# Patient Record
Sex: MALE | Race: BLACK OR AFRICAN AMERICAN | URBAN - METROPOLITAN AREA
[De-identification: names, ages, dates, MRNs, and addresses within clinical notes are randomized per-mention and may not be internally consistent; named-entity substitution may affect disease eponyms.]

---

## 2017-02-13 ENCOUNTER — APPOINTMENT (OUTPATIENT)
Dept: GENERAL RADIOLOGY | Facility: CLINIC | Age: 52
End: 2017-02-13
Attending: EMERGENCY MEDICINE
Payer: COMMERCIAL

## 2017-02-13 ENCOUNTER — HOSPITAL ENCOUNTER (EMERGENCY)
Facility: CLINIC | Age: 52
Discharge: ANOTHER HOSPITAL | End: 2017-02-13
Attending: EMERGENCY MEDICINE | Admitting: EMERGENCY MEDICINE
Payer: COMMERCIAL

## 2017-02-13 VITALS
WEIGHT: 210 LBS | HEART RATE: 62 BPM | HEIGHT: 67 IN | BODY MASS INDEX: 32.96 KG/M2 | RESPIRATION RATE: 18 BRPM | OXYGEN SATURATION: 99 % | TEMPERATURE: 99.1 F | SYSTOLIC BLOOD PRESSURE: 140 MMHG | DIASTOLIC BLOOD PRESSURE: 69 MMHG
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
## 2017-02-13 DIAGNOSIS — S01.111A EYELID LACERATION, RIGHT, INITIAL ENCOUNTER: ICD-10-CM

## 2017-02-13 DIAGNOSIS — S09.90XA CLOSED HEAD INJURY, INITIAL ENCOUNTER: ICD-10-CM

## 2017-02-13 DIAGNOSIS — S05.8X1A BLUNT TRAUMA EYE, RIGHT, INITIAL ENCOUNTER: ICD-10-CM

## 2017-02-13 DIAGNOSIS — H11.31 SUBCONJUNCTIVAL HEMORRHAGE, RIGHT: ICD-10-CM

## 2017-02-13 PROCEDURE — 90471 IMMUNIZATION ADMIN: CPT

## 2017-02-13 PROCEDURE — 99284 EMERGENCY DEPT VISIT MOD MDM: CPT | Mod: 25

## 2017-02-13 PROCEDURE — 70200 X-RAY EXAM OF EYE SOCKETS: CPT

## 2017-02-13 PROCEDURE — 99284 EMERGENCY DEPT VISIT MOD MDM: CPT | Performed by: EMERGENCY MEDICINE

## 2017-02-13 PROCEDURE — 25000125 ZZHC RX 250: Performed by: EMERGENCY MEDICINE

## 2017-02-13 PROCEDURE — 90715 TDAP VACCINE 7 YRS/> IM: CPT | Performed by: EMERGENCY MEDICINE

## 2017-02-13 RX ADMIN — CLOSTRIDIUM TETANI TOXOID ANTIGEN (FORMALDEHYDE INACTIVATED), CORYNEBACTERIUM DIPHTHERIAE TOXOID ANTIGEN (FORMALDEHYDE INACTIVATED), BORDETELLA PERTUSSIS TOXOID ANTIGEN (GLUTARALDEHYDE INACTIVATED), BORDETELLA PERTUSSIS FILAMENTOUS HEMAGGLUTININ ANTIGEN (FORMALDEHYDE INACTIVATED), BORDETELLA PERTUSSIS PERTACTIN ANTIGEN, AND BORDETELLA PERTUSSIS FIMBRIAE 2/3 ANTIGEN 0.5 ML: 5; 2; 2.5; 5; 3; 5 INJECTION, SUSPENSION INTRAMUSCULAR at 09:28

## 2017-02-13 ASSESSMENT — VISUAL ACUITY
OD: 20/200
OS: 20/50
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
## 2018-11-16 ENCOUNTER — RECORDS - HEALTHEAST (OUTPATIENT)
Dept: ADMINISTRATIVE | Facility: OTHER | Age: 53
End: 2018-11-16

## 2018-12-18 ENCOUNTER — HOSPITAL ENCOUNTER (OUTPATIENT)
Dept: CT IMAGING | Facility: CLINIC | Age: 53
Discharge: HOME | End: 2018-12-18
Attending: ORTHOPAEDIC SURGERY

## 2018-12-18 DIAGNOSIS — M25.551 HIP PAIN, RIGHT: ICD-10-CM

## 2019-01-30 ASSESSMENT — MIFFLIN-ST. JEOR: SCORE: 1731.65

## 2019-02-01 ENCOUNTER — ANESTHESIA - HEALTHEAST (OUTPATIENT)
Dept: SURGERY | Facility: CLINIC | Age: 54
End: 2019-02-01

## 2019-02-04 ENCOUNTER — SURGERY - HEALTHEAST (OUTPATIENT)
Dept: SURGERY | Facility: CLINIC | Age: 54
End: 2019-02-04

## 2019-02-04 ASSESSMENT — MIFFLIN-ST. JEOR: SCORE: 1731.65

## 2021-05-28 ENCOUNTER — RECORDS - HEALTHEAST (OUTPATIENT)
Dept: ADMINISTRATIVE | Facility: CLINIC | Age: 56
End: 2021-05-28

## 2021-06-02 VITALS — HEIGHT: 67 IN | WEIGHT: 209 LBS | BODY MASS INDEX: 32.8 KG/M2

## 2021-06-16 PROBLEM — M16.9 DEGENERATIVE JOINT DISEASE (DJD) OF HIP: Status: ACTIVE | Noted: 2019-02-04

## 2021-06-23 NOTE — ANESTHESIA PROCEDURE NOTES
Spinal Block    Patient location during procedure: OR  Start time: 2/4/2019 7:33 AM  End time: 2/4/2019 7:36 AM  Reason for block: primary anesthetic    Staffing:  Performing  Anesthesiologist: Jericho Gayle MD    Preanesthetic Checklist  Completed: patient identified, risks, benefits, and alternatives discussed, timeout performed, consent obtained, airway assessed, oxygen available, suction available, emergency drugs available and hand hygiene performed  Spinal Block  Patient position: sitting  Prep: ChloraPrep and site prepped and draped  Patient monitoring: blood pressure, continuous pulse ox, cardiac monitor and heart rate  Approach: midline  Location: L3-4  Injection technique: single-shot  Needle type: pencil-tip   Needle gauge: 25 G

## 2021-06-23 NOTE — ANESTHESIA POSTPROCEDURE EVALUATION
Patient: Sunny Quinn  RIGHT TOTAL HIP ARTHROPLASTY  Anesthesia type: spinal    Patient location: PACU  Last vitals:   Vitals:    02/04/19 1117   BP: 119/74   Pulse: (!) 58   Resp: 16   Temp: 36.4  C (97.6  F)   SpO2: 94%     Post vital signs: stable  Level of consciousness: awake and responds to simple questions  Post-anesthesia pain: pain controlled  Post-anesthesia nausea and vomiting: no  Pulmonary: unassisted, spontaneous ventilation, nasal cannula  Cardiovascular: stable and blood pressure at baseline  Hydration: adequate  Anesthetic events: no    QCDR Measures:  ASA# 11 - Katheryn-op Cardiac Arrest: ASA11B - Patient did NOT experience unanticipated cardiac arrest  ASA# 12 - Katheryn-op Mortality Rate: ASA12B - Patient did NOT die  ASA# 13 - PACU Re-Intubation Rate: NA - No ETT / LMA used for case  ASA# 10 - Composite Anes Safety: ASA10A - No serious adverse event    Additional Notes: doing well, no complaints

## 2021-06-23 NOTE — ANESTHESIA CARE TRANSFER NOTE
Last vitals:   Vitals:    02/04/19 0940   BP: (P) 135/76   Pulse: (P) 74   Resp: (P) 16   Temp: (P) 36.4  C (97.5  F)   SpO2: (P) 98%     Patient's level of consciousness is awake and drowsy  Spontaneous respirations: yes  Maintains airway independently: yes  Dentition unchanged: yes  Oropharynx: oropharynx clear of all foreign objects    QCDR Measures:  ASA# 20 - Surgical Safety Checklist: WHO surgical safety checklist completed prior to induction    PQRS# 430 - Adult PONV Prevention: 4558F - Pt received => 2 anti-emetic agents (different classes) preop & intraop  ASA# 8 - Peds PONV Prevention: NA - Not pediatric patient, not GA or 2 or more risk factors NOT present  PQRS# 424 - Katheryn-op Temp Management: 4559F - At least one body temp DOCUMENTED => 35.5C or 95.9F within required timeframe  PQRS# 426 - PACU Transfer Protocol: - Transfer of care checklist used  ASA# 14 - Acute Post-op Pain: ASA14B - Patient did NOT experience pain >= 7 out of 10

## 2021-06-23 NOTE — ANESTHESIA PREPROCEDURE EVALUATION
Anesthesia Evaluation      Patient summary reviewed   No history of anesthetic complications     Airway   Mallampati: II  Neck ROM: full   Pulmonary - normal exam   (-) sleep apnea, not a smoker                         Cardiovascular - normal exam  Exercise tolerance: > or = 4 METS  (+) hypertension, ,     (-) past MI, CAD, CABG/stent  ECG reviewed        Neuro/Psych - negative ROS     Endo/Other    (+) arthritis, obesity,      GI/Hepatic/Renal - negative ROS           Dental - normal exam                        Anesthesia Plan  Planned anesthetic: spinal  Decadron, Zofran.  Diprivan infusion.  Ketamine (0.5 mg/kg).  ASA 2     Anesthetic plan and risks discussed with: patient  Anesthesia plan special considerations: antiemetics,   Post-op plan: routine recovery